# Patient Record
Sex: FEMALE | Race: WHITE | NOT HISPANIC OR LATINO | URBAN - METROPOLITAN AREA
[De-identification: names, ages, dates, MRNs, and addresses within clinical notes are randomized per-mention and may not be internally consistent; named-entity substitution may affect disease eponyms.]

---

## 2017-09-27 ENCOUNTER — APPOINTMENT (RX ONLY)
Dept: URBAN - METROPOLITAN AREA CLINIC 61 | Facility: CLINIC | Age: 82
Setting detail: DERMATOLOGY
End: 2017-09-27

## 2017-09-27 DIAGNOSIS — D22 MELANOCYTIC NEVI: ICD-10-CM

## 2017-09-27 DIAGNOSIS — B37.2 CANDIDIASIS OF SKIN AND NAIL: ICD-10-CM

## 2017-09-27 PROBLEM — D48.5 NEOPLASM OF UNCERTAIN BEHAVIOR OF SKIN: Status: ACTIVE | Noted: 2017-09-27

## 2017-09-27 PROCEDURE — 11100: CPT

## 2017-09-27 PROCEDURE — ? COUNSELING

## 2017-09-27 PROCEDURE — 99213 OFFICE O/P EST LOW 20 MIN: CPT | Mod: 25

## 2017-09-27 PROCEDURE — ? BIOPSY BY SHAVE METHOD

## 2017-09-27 PROCEDURE — ? TREATMENT REGIMEN

## 2017-09-27 PROCEDURE — ? PRESCRIPTION

## 2017-09-27 RX ORDER — ECONAZOLE NITRATE 10 MG/G
CREAM TOPICAL
Qty: 1 | Refills: 0

## 2017-09-27 ASSESSMENT — LOCATION DETAILED DESCRIPTION DERM
LOCATION DETAILED: RIGHT INFRAMAMMARY CREASE
LOCATION DETAILED: LEFT INFRAMAMMARY CREASE (INNER QUADRANT)
LOCATION DETAILED: LEFT SUPERIOR MEDIAL MIDBACK

## 2017-09-27 ASSESSMENT — LOCATION SIMPLE DESCRIPTION DERM
LOCATION SIMPLE: LEFT LOWER BACK
LOCATION SIMPLE: RIGHT BREAST
LOCATION SIMPLE: LEFT BREAST

## 2017-09-27 ASSESSMENT — LOCATION ZONE DERM: LOCATION ZONE: TRUNK

## 2017-09-27 NOTE — PROCEDURE: BIOPSY BY SHAVE METHOD
Render Post-Care Instructions In Note?: yes
Size Of Lesion In Cm: 0
Biopsy Type: H and E
Dressing: pressure dressing with telfa
Type Of Destruction Used: Electrodesiccation
Billing Type: Third-Party Bill
Detail Level: Detailed
Consent: Verbal consent was obtained and risks were reviewed including but not limited to scarring, infection, bleeding, scabbing, incomplete removal, nerve damage and allergy to anesthesia.
Anesthesia Volume In Cc (Will Not Render If 0): 2
Bill 85954 For Specimen Handling/Conveyance To Laboratory?: no
Wound Care: Vaseline
Biopsy Method: Dermablade
Lab Facility: [id number]
Hemostasis: Drysol
Lab: [id number]
Electrodesiccation Text: The wound bed was treated with electrodesiccation after the biopsy was performed.
Notification Instructions: Patient will be notified of biopsy results. However, patient instructed to call the office if not contacted within 2 weeks.
Post-Care Instructions: I reviewed with the patient in detail post-care instructions. Patient is to keep the biopsy site dry overnight, and then apply vaseline twice daily until healed.
Anesthesia Type: 1% lidocaine without epinephrine
Cryotherapy Text: The wound bed was treated with cryotherapy after the biopsy was performed.
Electrodesiccation And Curettage Text: The wound bed was treated with electrodesiccation and curettage after the biopsy was performed.
Silver Nitrate Text: The wound bed was treated with silver nitrate after the biopsy was performed.